# Patient Record
Sex: FEMALE | URBAN - METROPOLITAN AREA
[De-identification: names, ages, dates, MRNs, and addresses within clinical notes are randomized per-mention and may not be internally consistent; named-entity substitution may affect disease eponyms.]

---

## 2021-03-11 ENCOUNTER — OFFICE VISIT (OUTPATIENT)
Dept: URBAN - METROPOLITAN AREA CLINIC 33 | Facility: CLINIC | Age: 56
End: 2021-03-11

## 2021-03-11 ENCOUNTER — OFFICE VISIT (OUTPATIENT)
Dept: URBAN - METROPOLITAN AREA TELEHEALTH 2 | Facility: TELEHEALTH | Age: 56
End: 2021-03-11

## 2021-03-11 VITALS
SYSTOLIC BLOOD PRESSURE: 152 MMHG | DIASTOLIC BLOOD PRESSURE: 98 MMHG | WEIGHT: 150 LBS | HEART RATE: 68 BPM | TEMPERATURE: 97.8 F | HEIGHT: 62 IN | BODY MASS INDEX: 27.6 KG/M2

## 2021-03-11 PROBLEM — 305058001: Status: ACTIVE | Noted: 2021-03-11

## 2021-03-11 PROBLEM — 442076002: Status: ACTIVE | Noted: 2021-03-11

## 2021-03-11 RX ORDER — ESTRADIOL 1 MG/1
TAKE 1 TABLET BY MOUTH ONCE DAILY FOR 30 DAYS TABLET ORAL
Qty: 30 UNSPECIFIED | Refills: 0 | Status: ACTIVE | COMMUNITY

## 2021-03-11 RX ORDER — BUPROPION HYDROCHLORIDE 300 MG/1
TAKE 1 TABLET BY MOUTH ONCE DAILY IN THE MORNING TABLET, EXTENDED RELEASE ORAL
Qty: 30 UNSPECIFIED | Refills: 0 | Status: ACTIVE | COMMUNITY

## 2021-03-11 RX ORDER — CHLORHEXIDINE GLUCONATE 1.2 MG/ML
SWISH 1 CAPFUL FOR 1 MINUTE AND SPIT BY MOUTH TWICE DAILY RINSE ORAL
Qty: 473 MILLILITER | Refills: 0 | Status: DISCONTINUED | COMMUNITY

## 2021-03-11 RX ORDER — SODIUM, POTASSIUM,MAG SULFATES 17.5-3.13G
ML AS DIRECTED SOLUTION, RECONSTITUTED, ORAL ORAL
Qty: 1 KIT | Refills: 0 | OUTPATIENT
Start: 2021-03-11

## 2021-03-11 RX ORDER — LEVOTHYROXINE SODIUM 100 UG/1
TAKE 1 TABLET BY MOUTH ONCE DAILY IN THE MORNING TABLET ORAL
Qty: 90 UNSPECIFIED | Refills: 1 | Status: ON HOLD | COMMUNITY

## 2021-03-11 RX ORDER — AMPHETAMINE SULFATE 10 MG/1
1 TABLET IN THE MORNING TABLET ORAL ONCE A DAY
Status: ACTIVE | COMMUNITY

## 2021-03-11 RX ORDER — ALBUTEROL SULFATE 90 UG/1
1 PUFF AS NEEDED AEROSOL, METERED RESPIRATORY (INHALATION)
Status: ACTIVE | COMMUNITY

## 2021-03-11 RX ORDER — AMLODIPINE BESYLATE 5 MG/1
TAKE 1 TABLET BY MOUTH ONCE DAILY TABLET ORAL
Qty: 30 UNSPECIFIED | Refills: 0 | Status: DISCONTINUED | COMMUNITY

## 2021-03-11 RX ORDER — HYOSCYAMINE SULFATE 0.12 MG/1
1 TABLET AS NEEDED TABLET ORAL
Qty: 90 TABLET | Refills: 2 | OUTPATIENT
Start: 2021-03-11

## 2021-03-11 RX ORDER — LEVOTHYROXINE SODIUM 100 UG/1
TAKE 1 TABLET BY MOUTH ONCE DAILY IN THE MORNING TABLET ORAL
Qty: 90 UNSPECIFIED | Refills: 1 | Status: ACTIVE | COMMUNITY

## 2021-03-11 RX ORDER — ALPRAZOLAM 0.25 MG/1
1 TABLET TABLET ORAL TWICE A DAY
Status: ACTIVE | COMMUNITY

## 2021-03-11 NOTE — HPI-MIGRATED HPI
;   ;   ;   ;   ;     Bloating/Gas : She reports excessive belching, flatulence, and abdominal distension onset 4 days ago. She has tried Tums with mild relief of symptoms. She notes eating yogurt daily is an alleviating factor. She denies any aggravating factors. ;   Fecal Urgency : Patient admits recent onset of change in bowel habits. Onset was 03/07/2021.  Patient currently admits 2 bowel movements per day, with strain. Stools vary from separate hard lumps, to fluffy pieces with ragged edges and reports 1 episode of diarrhea. She reports the diarrhea episode was green liquid. She admits mucus, and blood in stools. She denies any melena.    Denies fecal incontinence. She admits associated fecal urgency with diarrhea episodes.  ;   Change in bowel habits : Patient admits recent onset of change in bowel habits. Onset was 03/07/2021.  Patient currently admits 2 bowel movements per day, with strain. Stools vary from separate hard lumps, to fluffy pieces with ragged edges and reports 1 episode of diarrhea. She reports the diarrhea episode was green liquid. She admits mucus, and blood in stools. She denies any melena.   Patient states that her previous bowel habits were 2 movements per day, that would vary from normal and formed to medium sized richie. She denies any  aggravating or alleviating factors. She has tried Tums with no relief of symptoms. When patient has a bowel movement she does not feel like she is completely emptying her bowels. Denies fecal incontinence. She admits associated fecal urgency with diarrhea episodes.   ;   Rectal Bleeding : 55 Year old female patient presents today for a consultation about rectal bleeding. Bleeding started 03/10/21 and has happened with each bowel movement since then. The blood is bright red in color and is present on tissue and within the water bowl. She notes there is also mucus discharge with blood with a foul smell.   Patient admits recent onset of change in bowel habits. Onset was 03/07/2021.  Patient currently admits 2 bowel movements per day, with strain. Stools vary from separate hard lumps, to fluffy pieces with ragged edges and reports 1 episode of diarrhea.  She admits mucus, and blood in stools. She denies any melena. ;   Abdominal Pain : Abdominal pain that is located lower mid abdomen that radiates to her lower back. She describes pain that varies from a stabbing to a cramping or ache. Pain onset since her early 20's, with episodes occurring 2 times per month and lasting until she has a bowel movement. She notes eating hot foods, or spicy foods, or acidic foods/drinks are aggravating factors. She admits eating bland foods like crackers or potatoes are alleviating factors. She has tried Tums with mild relief of symptoms.   She had an episode of severe lower abdominal pain after eating a few days   ;

## 2021-03-11 NOTE — EXAM-MIGRATED EXAMINATIONS
GENERAL APPEARANCE: - alert, in no acute distress, well developed, well nourished;   HEAD: - normocephalic, atraumatic;   EYES: - sclera anicteric bilaterally;   EARS: - normal;   ORAL CAVITY: - mucosa moist;   THROAT: - clear;   NECK/THYROID: - neck supple, full range of motion, no cervical lymphadenopathy, no thyroid nodules, no thyromegaly, trachea midline;   LYMPH NODES: - no cervical adenopathy, no supraclavicular adenopathy, no periumbilical adenopathy;   SKIN: - no suspicious lesions, warm and dry, no spider angiomata, palmar erythema or icterus;   HEART: - no murmurs, regular rate and rhythm, S1, S2 normal;   LUNGS: - clear to auscultation bilaterally, good air movement, no wheezes, rales, rhonchi;   ABDOMEN: - bowel sounds present, no masses palpable, no organomegaly , no rebound tenderness, soft, tender- lower abdomen, nondistended;   RECTAL: - deferred by patient;   MUSCULOSKELETAL: - normal posture, normal gait and station, no decreased range of motion;   EXTREMITIES: - no clubbing, cyanosis, or edema;   NEUROLOGIC: - cranial nerves 2-12 grossly intact;   PSYCH: - cooperative with exam, mood/affect full range;

## 2021-03-22 ENCOUNTER — LAB OUTSIDE AN ENCOUNTER (OUTPATIENT)
Dept: URBAN - METROPOLITAN AREA CLINIC 35 | Facility: CLINIC | Age: 56
End: 2021-03-22

## 2021-03-25 LAB
IMMUNOGLOBULIN A: 248
INTERPRETATION: (no result)
TISSUE TRANSGLUTAMINASE$AB, IGA: 1

## 2021-03-27 LAB
C. DIFFICILE TOXIN A/B, STOOL - QDX: (no result)
CALPROTECTIN, STOOL - QDX: (no result)
GASTROINTESTINAL PATHOGEN: (no result)
H. PYLORI (EIA) - QDX: NEGATIVE
PANCREATICELASTASE ELISA, STOOL: (no result)

## 2021-03-31 ENCOUNTER — TELEPHONE ENCOUNTER (OUTPATIENT)
Dept: URBAN - METROPOLITAN AREA CLINIC 35 | Facility: CLINIC | Age: 56
End: 2021-03-31

## 2021-04-14 ENCOUNTER — TELEPHONE ENCOUNTER (OUTPATIENT)
Dept: URBAN - METROPOLITAN AREA CLINIC 35 | Facility: CLINIC | Age: 56
End: 2021-04-14

## 2021-04-14 ENCOUNTER — OFFICE VISIT (OUTPATIENT)
Dept: URBAN - METROPOLITAN AREA SURGERY CENTER 8 | Facility: SURGERY CENTER | Age: 56
End: 2021-04-14

## 2021-04-14 RX ORDER — DOCUSATE SODIUM 100 MG/1
1 CAPSULE AS NEEDED CAPSULE ORAL ONCE A DAY
Qty: 30 | Status: ACTIVE | COMMUNITY

## 2021-04-14 RX ORDER — PLECANATIDE 3 MG/1
1 TABLET TABLET ORAL ONCE A DAY
Qty: 30 | Status: ON HOLD | COMMUNITY

## 2021-04-14 RX ORDER — ALPRAZOLAM 0.25 MG/1
1 TABLET TABLET ORAL TWICE A DAY
COMMUNITY

## 2021-04-14 RX ORDER — AMPHETAMINE SULFATE 10 MG/1
1 TABLET IN THE MORNING TABLET ORAL ONCE A DAY
COMMUNITY

## 2021-04-14 RX ORDER — NIFEDIPINE
POWDER (GRAM) MISCELLANEOUS TID
Status: ACTIVE | COMMUNITY

## 2021-04-14 RX ORDER — HYOSCYAMINE SULFATE 0.12 MG/1
1 TABLET AS NEEDED TABLET ORAL
Qty: 90 TABLET | Refills: 2 | COMMUNITY
Start: 2021-03-11

## 2021-04-14 RX ORDER — ESTRADIOL 1 MG/1
TAKE 1 TABLET BY MOUTH ONCE DAILY FOR 30 DAYS TABLET ORAL
Qty: 30 UNSPECIFIED | Refills: 0 | COMMUNITY

## 2021-04-14 RX ORDER — SODIUM, POTASSIUM,MAG SULFATES 17.5-3.13G
ML AS DIRECTED SOLUTION, RECONSTITUTED, ORAL ORAL
Qty: 1 KIT | Refills: 0 | Status: DISCONTINUED | COMMUNITY
Start: 2021-03-11

## 2021-04-14 RX ORDER — ALBUTEROL SULFATE 90 UG/1
1 PUFF AS NEEDED AEROSOL, METERED RESPIRATORY (INHALATION)
COMMUNITY

## 2021-04-14 RX ORDER — LEVOTHYROXINE SODIUM 100 UG/1
TAKE 1 TABLET BY MOUTH ONCE DAILY IN THE MORNING TABLET ORAL
Qty: 90 UNSPECIFIED | Refills: 1 | COMMUNITY

## 2021-04-14 RX ORDER — BUPROPION HYDROCHLORIDE 300 MG/1
TAKE 1 TABLET BY MOUTH ONCE DAILY IN THE MORNING TABLET, EXTENDED RELEASE ORAL
Qty: 30 UNSPECIFIED | Refills: 0 | COMMUNITY

## 2021-04-27 ENCOUNTER — OFFICE VISIT (OUTPATIENT)
Dept: URBAN - METROPOLITAN AREA CLINIC 35 | Facility: CLINIC | Age: 56
End: 2021-04-27

## 2021-04-27 ENCOUNTER — TELEPHONE ENCOUNTER (OUTPATIENT)
Dept: URBAN - METROPOLITAN AREA CLINIC 35 | Facility: CLINIC | Age: 56
End: 2021-04-27

## 2021-04-27 RX ORDER — LEVOTHYROXINE SODIUM 100 UG/1
TAKE 1 TABLET BY MOUTH ONCE DAILY IN THE MORNING TABLET ORAL
Qty: 90 UNSPECIFIED | Refills: 1 | COMMUNITY

## 2021-04-27 RX ORDER — AMPHETAMINE SULFATE 10 MG/1
1 TABLET IN THE MORNING TABLET ORAL ONCE A DAY
COMMUNITY

## 2021-04-27 RX ORDER — DOCUSATE SODIUM 100 MG/1
1 CAPSULE AS NEEDED CAPSULE ORAL ONCE A DAY
Qty: 30 | Status: ACTIVE | COMMUNITY

## 2021-04-27 RX ORDER — NIFEDIPINE
POWDER (GRAM) MISCELLANEOUS TID
Status: ACTIVE | COMMUNITY

## 2021-04-27 RX ORDER — ALBUTEROL SULFATE 90 UG/1
1 PUFF AS NEEDED AEROSOL, METERED RESPIRATORY (INHALATION)
COMMUNITY

## 2021-04-27 RX ORDER — PLECANATIDE 3 MG/1
1 TABLET TABLET ORAL ONCE A DAY
Qty: 30 | Status: ON HOLD | COMMUNITY

## 2021-04-27 RX ORDER — ESTRADIOL 1 MG/1
TAKE 1 TABLET BY MOUTH ONCE DAILY FOR 30 DAYS TABLET ORAL
Qty: 30 UNSPECIFIED | Refills: 0 | COMMUNITY

## 2021-04-27 RX ORDER — ALPRAZOLAM 0.25 MG/1
1 TABLET TABLET ORAL TWICE A DAY
COMMUNITY

## 2021-04-27 RX ORDER — BUPROPION HYDROCHLORIDE 300 MG/1
TAKE 1 TABLET BY MOUTH ONCE DAILY IN THE MORNING TABLET, EXTENDED RELEASE ORAL
Qty: 30 UNSPECIFIED | Refills: 0 | COMMUNITY

## 2021-04-27 RX ORDER — HYOSCYAMINE SULFATE 0.12 MG/1
1 TABLET AS NEEDED TABLET ORAL
Qty: 90 TABLET | Refills: 2 | COMMUNITY
Start: 2021-03-11

## 2021-04-27 NOTE — HPI-MIGRATED HPI
;   ;   ;   ;   ;     Bloating/Gas : She reports excessive belching, flatulence, and abdominal distension onset 4 days ago. She has tried Tums with mild relief of symptoms. She notes eating yogurt daily is an alleviating factor. She denies any aggravating factors.;   Fecal Urgency : Patient admits recent onset of change in bowel habits. Onset was 03/07/2021.  Patient currently admits 2 bowel movements per day, with strain. Stools vary from separate hard lumps, to fluffy pieces with ragged edges and reports 1 episode of diarrhea. She reports the diarrhea episode was green liquid. She admits mucus, and blood in stools. She denies any melena.    Denies fecal incontinence. She admits associated fecal urgency with diarrhea episodes.;   Change in bowel habits : Patient admits recent onset of change in bowel habits. Onset was 03/07/2021.  Patient currently admits 2 bowel movements per day, with strain. Stools vary from separate hard lumps, to fluffy pieces with ragged edges and reports 1 episode of diarrhea. She reports the diarrhea episode was green liquid. She admits mucus, and blood in stools. She denies any melena.   Patient states that her previous bowel habits were 2 movements per day, that would vary from normal and formed to medium sized richie. She denies any  aggravating or alleviating factors. She has tried Tums with no relief of symptoms. When patient has a bowel movement she does not feel like she is completely emptying her bowels. Denies fecal incontinence. She admits associated fecal urgency with diarrhea episodes.;   Rectal Bleeding : 55 Year old female patient presents today for a consultation about rectal bleeding. Bleeding started 03/10/21 and has happened with each bowel movement since then. The blood is bright red in color and is present on tissue and within the water bowl. She notes there is also mucus discharge with blood with a foul smell.   Patient admits recent onset of change in bowel habits. Onset was 03/07/2021.  Patient currently admits 2 bowel movements per day, with strain. Stools vary from separate hard lumps, to fluffy pieces with ragged edges and reports 1 episode of diarrhea.  She admits mucus, and blood in stools. She denies any melena.;   Abdominal Pain : Abdominal pain that is located lower mid abdomen that radiates to her lower back. She describes pain that varies from a stabbing to a cramping or ache. Pain onset since her early 20's, with episodes occurring 2 times per month and lasting until she has a bowel movement. She notes eating hot foods, or spicy foods, or acidic foods/drinks are aggravating factors. She admits eating bland foods like crackers or potatoes are alleviating factors. She has tried Tums with mild relief of symptoms.   She had an episode of severe lower abdominal pain after eating a few days;

## 2021-05-04 ENCOUNTER — OFFICE VISIT (OUTPATIENT)
Dept: URBAN - METROPOLITAN AREA CLINIC 35 | Facility: CLINIC | Age: 56
End: 2021-05-04

## 2021-05-04 VITALS — BODY MASS INDEX: 26.5 KG/M2 | WEIGHT: 144 LBS | HEIGHT: 62 IN

## 2021-05-04 PROBLEM — 12063002: Status: ACTIVE | Noted: 2021-03-11

## 2021-05-04 PROBLEM — 71820002: Status: ACTIVE | Noted: 2021-03-11

## 2021-05-04 PROBLEM — 102614006: Status: ACTIVE | Noted: 2021-03-11

## 2021-05-04 PROBLEM — 129851009: Status: ACTIVE | Noted: 2021-03-11

## 2021-05-04 PROBLEM — 398050005 DIVERTICULAR DISEASE OF COLON: Status: ACTIVE | Noted: 2021-05-04

## 2021-05-04 RX ORDER — LEVOTHYROXINE SODIUM 100 UG/1
TAKE 1 TABLET BY MOUTH ONCE DAILY IN THE MORNING TABLET ORAL
Qty: 90 UNSPECIFIED | Refills: 1 | Status: DISCONTINUED | COMMUNITY

## 2021-05-04 RX ORDER — LORATADINE 10 MG
1 PACKET MIXED WITH 8 OUNCES OF FLUID TABLET,DISINTEGRATING ORAL ONCE A DAY
Qty: 30 | OUTPATIENT
Start: 2021-05-04

## 2021-05-04 RX ORDER — PLECANATIDE 3 MG/1
1 TABLET TABLET ORAL ONCE A DAY
Qty: 30 | Status: ON HOLD | COMMUNITY

## 2021-05-04 RX ORDER — HYOSCYAMINE SULFATE 0.12 MG/1
1 TABLET AS NEEDED TABLET ORAL
Qty: 90 TABLET | Refills: 2 | Status: ACTIVE | COMMUNITY
Start: 2021-03-11

## 2021-05-04 RX ORDER — ALBUTEROL SULFATE 90 UG/1
1 PUFF AS NEEDED AEROSOL, METERED RESPIRATORY (INHALATION)
Status: ACTIVE | COMMUNITY

## 2021-05-04 RX ORDER — BUPROPION HYDROCHLORIDE 300 MG/1
TAKE 1 TABLET BY MOUTH ONCE DAILY IN THE MORNING TABLET, EXTENDED RELEASE ORAL
Qty: 30 UNSPECIFIED | Refills: 0 | Status: ACTIVE | COMMUNITY

## 2021-05-04 RX ORDER — ESTRADIOL 1 MG/1
TAKE 1 TABLET BY MOUTH ONCE DAILY FOR 30 DAYS TABLET ORAL
Qty: 30 UNSPECIFIED | Refills: 0 | Status: ACTIVE | COMMUNITY

## 2021-05-04 RX ORDER — NIFEDIPINE
POWDER (GRAM) MISCELLANEOUS TID
Status: ACTIVE | COMMUNITY

## 2021-05-04 RX ORDER — ALPRAZOLAM 0.25 MG/1
1 TABLET TABLET ORAL TWICE A DAY
Status: DISCONTINUED | COMMUNITY

## 2021-05-04 RX ORDER — DOCUSATE SODIUM 100 MG/1
1 CAPSULE AS NEEDED CAPSULE ORAL ONCE A DAY
Qty: 30 | Status: ACTIVE | COMMUNITY

## 2021-05-04 RX ORDER — AMPHETAMINE SULFATE 10 MG/1
1 TABLET IN THE MORNING TABLET ORAL ONCE A DAY
Status: ACTIVE | COMMUNITY

## 2021-05-04 RX ORDER — LEVOTHYROXINE SODIUM 100 UG/1
TAKE 1 TABLET BY MOUTH ONCE DAILY IN THE MORNING TABLET ORAL
Qty: 90 UNSPECIFIED | Refills: 1 | Status: ACTIVE | COMMUNITY

## 2021-05-06 ENCOUNTER — TELEPHONE ENCOUNTER (OUTPATIENT)
Dept: URBAN - METROPOLITAN AREA CLINIC 35 | Facility: CLINIC | Age: 56
End: 2021-05-06

## 2024-03-27 NOTE — HPI-MIGRATED HPI
;   ;   ;   ;   ;     Bloating/Gas : Admits episodes of abdominal distension and flatulence associated with bowel movements.  Last visit (03/11/2021) She reports excessive belching, flatulence, and abdominal distension onset 4 days ago. She has tried Tums with mild relief of symptoms. She notes eating yogurt daily is an alleviating factor.  She denies any aggravating factors.;   Fecal Urgency : Admits episodes of fecal urgency associated with bowel movement following the use of Colace 100 mg.    Last visit (03/11/2021) Patient admits recent onset of change in bowel habits. Onset was 03/07/2021.  Patient currently admits 2 bowel movements per day, with strain. Stools vary from separate hard lumps, to fluffy pieces with ragged edges and reports 1 episode of diarrhea. She reports the diarrhea episode was green liquid. She admits mucus, and blood in stools. She denies any melena.    Denies fecal incontinence. She admits associated fecal urgency with diarrhea episodes.;   Change in bowel habits : 55-Year-old female who presents today via Televisit ( [employer] ) with consent after her colonoscopy, lab and CT results and follow up of change in bowel habits. She denies any complications after her procedure.  She currently reports 4-5 bowel movements 2 days a week the day following the use of Colace without strain.  Denies any mucus, melena or blood. Denies any pruritus ani or rectal pain.     Of ntoe, patient contact the office on (04/27/2021) c/o diarrhea with onset around 5 pm yesterday with 10+ loose and watery BM's with fecal urgency/incontinence, lower abdominal cramping and nausea.  She denies treatment of antibiotics, or drinking well water.  She has not taken Colace 100 mg QD x 1 week.  Denies melena, mucus in stools or bleeding.  Admit rectal pain and mild pruritus ani. She is using Nifedipine oint with relief of rectal pain.  She admit some relief in abdominal cramping with the use of Hyoscyamine Sulfate    Subsequently advisded to rest, increase fluids and take Imodium and Hyoscyamine  prn.   Symptoms persist for 24 hours, with lingering rectal irritation x 2-3 days.   She report her Son and his girlfriend had same symptoms     Last visit (03/11/2021) Patient admits recent onset of change in bowel habits. Onset was 03/07/2021.  Patient currently admits 2 bowel movements per day, with strain. Stools vary from separate hard lumps, to fluffy pieces with ragged edges and reports 1 episode of diarrhea. She reports the diarrhea episode was green liquid. She admits mucus, and blood in stools. She denies any melena.   Patient states that her previous bowel habits were 2 movements per day, that would vary from normal and formed to medium sized richie. She denies any  aggravating or alleviating factors. She has tried Tums with no relief of symptoms. When patient has a bowel movement she does not feel like she is completely emptying her bowels. Denies fecal incontinence. She admits associated fecal urgency with diarrhea episodes.;   Rectal Bleeding : Denies any episodes of rectal bleeding since her last office visit.      Last visit (03/11/2021) 55 Year old female patient presents today for a consultation about rectal bleeding. Bleeding started 03/10/21 and has happened with each bowel movement since then. The blood is bright red in color and is present on tissue and within the water bowl. She notes there is also mucus discharge with blood with a foul smell.   Patient admits recent onset of change in bowel habits. Onset was 03/07/2021.  Patient currently admits 2 bowel movements per day, with strain. Stools vary from separate hard lumps, to fluffy pieces with ragged edges and reports 1 episode of diarrhea.  She admits mucus, and blood in stools. She denies any melena.;   Abdominal Pain : Admits episodes of lower abdominal cramping prior to evacuations.  Symptoms improve with the Hyoscyamine and will dissipate on it's own following BM's     Last visit (03/11/2021) Abdominal pain that is located lower mid abdomen that radiates to her lower back. She describes pain that varies from a stabbing to a cramping or ache. Pain onset since her early 20's, with episodes occurring 2 times per month and lasting until she has a bowel movement. She notes eating hot foods, or spicy foods, or acidic foods/drinks are aggravating factors. She admits eating bland foods like crackers or potatoes are alleviating factors. She has tried Tums with mild relief of symptoms.   She had an episode of severe lower abdominal pain after eating a few days;    electronic